# Patient Record
Sex: MALE | Race: WHITE | NOT HISPANIC OR LATINO | Employment: FULL TIME | URBAN - METROPOLITAN AREA
[De-identification: names, ages, dates, MRNs, and addresses within clinical notes are randomized per-mention and may not be internally consistent; named-entity substitution may affect disease eponyms.]

---

## 2018-11-04 ENCOUNTER — OFFICE VISIT (OUTPATIENT)
Dept: URGENT CARE | Facility: PHYSICIAN GROUP | Age: 48
End: 2018-11-04
Payer: COMMERCIAL

## 2018-11-04 ENCOUNTER — TELEPHONE (OUTPATIENT)
Dept: URGENT CARE | Facility: PHYSICIAN GROUP | Age: 48
End: 2018-11-04

## 2018-11-04 ENCOUNTER — APPOINTMENT (OUTPATIENT)
Dept: RADIOLOGY | Facility: IMAGING CENTER | Age: 48
End: 2018-11-04
Attending: NURSE PRACTITIONER
Payer: COMMERCIAL

## 2018-11-04 VITALS
HEIGHT: 73 IN | SYSTOLIC BLOOD PRESSURE: 128 MMHG | DIASTOLIC BLOOD PRESSURE: 76 MMHG | WEIGHT: 270 LBS | TEMPERATURE: 98.6 F | OXYGEN SATURATION: 94 % | RESPIRATION RATE: 18 BRPM | BODY MASS INDEX: 35.78 KG/M2 | HEART RATE: 84 BPM

## 2018-11-04 DIAGNOSIS — S86.912A KNEE STRAIN, LEFT, INITIAL ENCOUNTER: ICD-10-CM

## 2018-11-04 DIAGNOSIS — S89.92XA LEFT KNEE INJURY, INITIAL ENCOUNTER: ICD-10-CM

## 2018-11-04 PROCEDURE — 73564 X-RAY EXAM KNEE 4 OR MORE: CPT | Mod: TC,LT | Performed by: NURSE PRACTITIONER

## 2018-11-04 PROCEDURE — 99204 OFFICE O/P NEW MOD 45 MIN: CPT | Performed by: NURSE PRACTITIONER

## 2018-11-04 RX ORDER — MELOXICAM 7.5 MG/1
7.5 TABLET ORAL DAILY
Qty: 30 TAB | Refills: 0 | Status: SHIPPED | OUTPATIENT
Start: 2018-11-04

## 2018-11-04 ASSESSMENT — ENCOUNTER SYMPTOMS
MYALGIAS: 1
SENSORY CHANGE: 0
FALLS: 0
TINGLING: 0
CHILLS: 0
FEVER: 0
WEAKNESS: 0
BRUISES/BLEEDS EASILY: 0

## 2018-11-04 NOTE — PROGRESS NOTES
"Subjective:      Chevy Nuno is a 48 y.o. male who presents with Knee Pain (Lt knee, over extended, X last night )            HPI  Chevy is here for acute left knee injury last night when he tripped over plastic when he was getting out of truck. States hyperextended his left knee and now has pain in back of knee. Ice and Ibuprofen last night. C/o \"tightness in back of knee\" with flexion/extension. No previous injury to left knee. Wife present.    PMH:  has no past medical history on file.  MEDS: No current outpatient prescriptions on file.  ALLERGIES: No Known Allergies  SURGHX: History reviewed. No pertinent surgical history.  SOCHX:  reports that he has never smoked. He has never used smokeless tobacco.  FH: Family history was reviewed, no pertinent findings to report    Review of Systems   Constitutional: Negative for chills, fever and malaise/fatigue.   Cardiovascular: Negative for leg swelling.   Musculoskeletal: Positive for joint pain and myalgias. Negative for falls.   Skin: Negative for itching and rash.   Neurological: Negative for tingling, sensory change and weakness.   Endo/Heme/Allergies: Does not bruise/bleed easily.   All other systems reviewed and are negative.         Objective:     /76   Pulse 84   Temp 37 °C (98.6 °F)   Resp 18   Ht 1.854 m (6' 1\")   Wt 122.5 kg (270 lb)   SpO2 94%   BMI 35.62 kg/m²      Physical Exam   Constitutional: He is oriented to person, place, and time. Vital signs are normal. He appears well-developed and well-nourished. He is active and cooperative.  Non-toxic appearance. He does not have a sickly appearance. He does not appear ill. No distress.   HENT:   Head: Normocephalic.   Eyes: Pupils are equal, round, and reactive to light. EOM are normal.   Cardiovascular: Normal rate.    Pulmonary/Chest: Effort normal.   Musculoskeletal: He exhibits edema and tenderness. He exhibits no deformity.        Left knee: He exhibits decreased range of motion, " swelling and LCL laxity. He exhibits no effusion, no ecchymosis, no deformity, no laceration, no erythema, normal alignment, normal patellar mobility, no bony tenderness and normal meniscus. Tenderness found.        Legs:  TTP at posterior aspect of left knee joint. No redness, mild swelling to anterior knee joint, decreased ROM with flexion to posterior knee due to discomfort.    Neurological: He is alert and oriented to person, place, and time.   Skin: Skin is warm and dry. No rash noted. He is not diaphoretic. No erythema.   Vitals reviewed.    Left knee xray:  No evidence of acute fracture or dislocation.  Small joint effusion.  Mild degenerative changes.  Further evaluation can be obtained with MRI.        MA applied left knee brace  Assessment/Plan:     1. Left knee injury, initial encounter    - DX-KNEE COMPLETE 4+ LEFT; Future  - REFERRAL TO SPORTS MEDICINE  - meloxicam (MOBIC) 7.5 MG Tab; Take 1 Tab by mouth every day.  Dispense: 30 Tab; Refill: 0    2. Knee strain, left, initial encounter    - REFERRAL TO SPORTS MEDICINE  - meloxicam (MOBIC) 7.5 MG Tab; Take 1 Tab by mouth every day.  Dispense: 30 Tab; Refill: 0    May use cool compresses for swelling prn  May utilize RICE method prn  Avoid excessive weight bearing until pan/swelling improve and rechecked by Sports Med  May apply topical analgesics prn  Perform proper body mechanics with lifting, twisting, bending and walking  Monitor for deformity, numbness/tingling in toes, decreased ROM with waking difficulty- need re-evaluation  Follow up with Sports Med next week

## 2018-11-08 ENCOUNTER — OFFICE VISIT (OUTPATIENT)
Dept: MEDICAL GROUP | Facility: CLINIC | Age: 48
End: 2018-11-08
Payer: COMMERCIAL

## 2018-11-08 VITALS
TEMPERATURE: 98.6 F | BODY MASS INDEX: 35.78 KG/M2 | HEIGHT: 73 IN | HEART RATE: 72 BPM | DIASTOLIC BLOOD PRESSURE: 84 MMHG | OXYGEN SATURATION: 96 % | SYSTOLIC BLOOD PRESSURE: 124 MMHG | RESPIRATION RATE: 18 BRPM | WEIGHT: 270 LBS

## 2018-11-08 DIAGNOSIS — S82.832A CLOSED FRACTURE OF PROXIMAL END OF LEFT FIBULA, UNSPECIFIED FRACTURE MORPHOLOGY, INITIAL ENCOUNTER: ICD-10-CM

## 2018-11-08 PROCEDURE — 99202 OFFICE O/P NEW SF 15 MIN: CPT | Mod: 25 | Performed by: FAMILY MEDICINE

## 2018-11-08 PROCEDURE — 27780 TREATMENT OF FIBULA FRACTURE: CPT | Mod: LT | Performed by: FAMILY MEDICINE

## 2018-11-08 ASSESSMENT — ENCOUNTER SYMPTOMS
HEADACHES: 0
SHORTNESS OF BREATH: 0
DIZZINESS: 0
NAUSEA: 0
FEVER: 0
CHILLS: 0
VOMITING: 0

## 2018-11-08 NOTE — PROGRESS NOTES
"Subjective:      Chevy Nuno is a 48 y.o. male who presents with Knee Pain (Referral from [facility]/ L knee pain )     Referred by GABRIELE Dick for evaluation of LEFT knee and ankle pain    HPI   LEFT knee and ankle pain   Date of injury, Saturday, November 3, 2018  Mechanism of injury, hyperextension of the LEFT knee  Sudden pain at the LEFT knee  Posterior knee, left leg, and LEFT lateral ankle from fibula to posterior heel region  Blacked out from the pain  Able to walk after the injury  Currently having pain predominantly at the proximal fibular region and with radiation down to the LEFT ankle and posterior ankle  POSITIVE swelling along the LEFT ankle  Denies any prior issues with the LEFT leg  Meloxicam which is not helping  POSITIVE night symptoms, constant    Review of Systems   Constitutional: Negative for chills and fever.   Respiratory: Negative for shortness of breath.    Cardiovascular: Negative for chest pain.   Gastrointestinal: Negative for nausea and vomiting.   Neurological: Negative for dizziness and headaches.     PMH:  has no past medical history on file.  MEDS:   Current Outpatient Prescriptions:   •  meloxicam (MOBIC) 7.5 MG Tab, Take 1 Tab by mouth every day., Disp: 30 Tab, Rfl: 0  ALLERGIES: No Known Allergies  SURGHX: History reviewed. No pertinent surgical history.  SOCHX:  reports that he has never smoked. He has never used smokeless tobacco.  FH: Family history was reviewed, no pertinent findings to report     Objective:     /84 (BP Location: Left arm, Patient Position: Sitting, BP Cuff Size: Adult)   Pulse 72   Temp 37 °C (98.6 °F) (Temporal)   Resp 18   Ht 1.854 m (6' 1\")   Wt 122.5 kg (270 lb)   SpO2 96%   BMI 35.62 kg/m²      Physical Exam     RIGHT ANKLE:  There is NO swelling noted at the ankle  Range of motion intact with dorsiflexion and plantarflexion, inversion and eversion  There is NO tenderness of the ATFL, CF or PTF ligament  There is NO tenderness " of the lateral malleolus or medial malleolus  Anterior drawer testing is NEGATIVE  Talar tilt testing is NEGATIVE  The foot and ankle is otherwise neurovascularly intact    RIGHT FOOT:  There is NO swelling noted at the foot  There is NO tenderness at the base of the fifth metatarsal, cuboid, or tarsal navicular  There is NO pain with metatarsal squeeze test    LEFT ANKLE:  There is POSITIVE swelling noted at the ankle  POSITIVE tenderness at the proximal fibula on the LEFT  Range of motion intact with dorsiflexion and plantarflexion, inversion and eversion  There is NO tenderness of the ATFL, CF or PTF ligament  There is NO tenderness of the lateral malleolus or medial malleolus  Anterior drawer testing is NEGATIVE  Talar tilt testing is NEGATIVE  The foot and ankle is otherwise neurovascularly intact    LEFT FOOT:  There is NO swelling noted at the foot  There is NO tenderness at the base of the fifth metatarsal, cuboid, or tarsal navicular  There is NO pain with metatarsal squeeze test    NEUTRAL stance  Able to ambulate with ANTALGIC gait       Assessment/Plan:     1. Closed fracture of proximal end of left fibula, unspecified fracture morphology, initial encounter       Acute injury, together with sudden sharp pain the lateral leg POSITIVE tenderness at the proximal fibula and POSITIVE cortical irregularity noted on x-ray which was not mentioned on the official report is consistent with acute proximal fibula fracture    Date of injury, Saturday, November 3, 2018  Nondisplaced proximal fibula fracture of the LEFT leg  Maisonneuve type fracture  Provided cam walker boot    The plan is to continue cam walker boot for a total of 6 weeks from the date of injury removal on or after December 15, 2018                11/4/2018 10:26 AM    HISTORY/REASON FOR EXAM:  Injury      TECHNIQUE/EXAM DESCRIPTION AND NUMBER OF VIEWS:  4 views of the LEFT knee.    COMPARISON: None    FINDINGS:  No acute fracture or dislocation is  seen. There is mild spurring of the tibial spines. There is minimal spurring of the patella. There is a small joint effusion.     Impression       No evidence of acute fracture or dislocation.    Small joint effusion.    Mild degenerative changes.    Further evaluation can be obtained with MRI.     Interpreted in the office today with the patient    Thank you GABRIELE Dick for allowing me to participate in caring for your patient.

## 2018-11-26 ENCOUNTER — OFFICE VISIT (OUTPATIENT)
Dept: MEDICAL GROUP | Facility: CLINIC | Age: 48
End: 2018-11-26
Payer: COMMERCIAL

## 2018-11-26 ENCOUNTER — HOSPITAL ENCOUNTER (OUTPATIENT)
Dept: RADIOLOGY | Facility: MEDICAL CENTER | Age: 48
End: 2018-11-26
Attending: FAMILY MEDICINE
Payer: COMMERCIAL

## 2018-11-26 VITALS
HEIGHT: 73 IN | HEART RATE: 70 BPM | OXYGEN SATURATION: 97 % | BODY MASS INDEX: 35.78 KG/M2 | WEIGHT: 270 LBS | RESPIRATION RATE: 18 BRPM | DIASTOLIC BLOOD PRESSURE: 80 MMHG | SYSTOLIC BLOOD PRESSURE: 122 MMHG | TEMPERATURE: 98.6 F

## 2018-11-26 DIAGNOSIS — M79.605 LEG PAIN, POSTERIOR, LEFT: ICD-10-CM

## 2018-11-26 DIAGNOSIS — S82.832D CLOSED FRACTURE OF PROXIMAL END OF LEFT FIBULA WITH ROUTINE HEALING, UNSPECIFIED FRACTURE MORPHOLOGY, SUBSEQUENT ENCOUNTER: ICD-10-CM

## 2018-11-26 PROCEDURE — 93971 EXTREMITY STUDY: CPT | Mod: LT

## 2018-11-26 PROCEDURE — 99212 OFFICE O/P EST SF 10 MIN: CPT | Mod: 24 | Performed by: FAMILY MEDICINE

## 2018-11-26 ASSESSMENT — ENCOUNTER SYMPTOMS
HEADACHES: 0
FEVER: 0
VOMITING: 0
DIZZINESS: 0
NAUSEA: 0
CHILLS: 0
SHORTNESS OF BREATH: 0

## 2018-11-26 NOTE — PROGRESS NOTES
"Subjective:      Chevy Nuno is a 48 y.o. male who presents with Ankle Injury (F/V L ankle injury )     Referred by GABRIELE Dick for evaluation of LEFT knee and ankle pain    HPI   FOLLOW UP for LEFT proximal fibula fracture and leg pain  Date of injury, Saturday, November 3, 2018  Mechanism of injury, hyperextension of the LEFT knee    Lateral leg pain is somewhat improved, now having posterior leg pain  Posterior to the knee, distal thigh and calf region on the LEFT    Review of Systems   Constitutional: Negative for chills and fever.   Respiratory: Negative for shortness of breath.    Cardiovascular: Negative for chest pain.   Gastrointestinal: Negative for nausea and vomiting.   Neurological: Negative for dizziness and headaches.     PMH:  has no past medical history on file.  MEDS:   Current Outpatient Prescriptions:   •  meloxicam (MOBIC) 7.5 MG Tab, Take 1 Tab by mouth every day., Disp: 30 Tab, Rfl: 0  ALLERGIES: No Known Allergies  SURGHX: No past surgical history on file.  SOCHX:  reports that he has never smoked. He has never used smokeless tobacco.  FH: Family history was reviewed, no pertinent findings to report     Objective:     /80 (BP Location: Left arm, Patient Position: Sitting, BP Cuff Size: Adult)   Pulse 70   Temp 37 °C (98.6 °F) (Temporal)   Resp 18   Ht 1.854 m (6' 1\")   Wt 122.5 kg (270 lb)   SpO2 97%   BMI 35.62 kg/m²      Physical Exam      LEFT ANKLE:  There is POSITIVE swelling noted at the ankle  MINIMAL to NO tenderness at the proximal fibula on the LEFT  Range of motion intact with dorsiflexion and plantarflexion, inversion and eversion  There is NO tenderness of the ATFL, CF or PTF ligament  There is NO tenderness of the lateral malleolus or medial malleolus  Anterior drawer testing is NEGATIVE  Talar tilt testing is NEGATIVE  The foot and ankle is otherwise neurovascularly intact    LEFT FOOT:  There is NO swelling noted at the foot  There is NO " tenderness at the base of the fifth metatarsal, cuboid, or tarsal navicular  There is NO pain with metatarsal squeeze test    NEUTRAL stance  Able to ambulate with ANTALGIC gait       Assessment/Plan:     1. Closed fracture of proximal end of left fibula with routine healing, unspecified fracture morphology, subsequent encounter     2. Leg pain, posterior, left  US-EXTREMITY VENOUS LOWER UNILAT LEFT     We will obtain ultrasound study of the LEFT lower extremity to rule out DVT since he seems to have pain and pressure sensitivity    Acute injury, together with sudden sharp pain the lateral leg POSITIVE tenderness at the proximal fibula and POSITIVE cortical irregularity noted on x-ray which was not mentioned on the official report is consistent with acute proximal fibula fracture    Date of injury, Saturday, November 3, 2018  Nondisplaced proximal fibula fracture of the LEFT leg  Maisonneuve type fracture  Continue fracture stabilization and cam walker boot    The plan is to continue cam walker boot for a total of 6 weeks from the date of injury removal on or after December 15, 2018    Return in about 2 weeks (around 12/10/2018).              11/4/2018 10:26 AM    HISTORY/REASON FOR EXAM:  Injury      TECHNIQUE/EXAM DESCRIPTION AND NUMBER OF VIEWS:  4 views of the LEFT knee.    COMPARISON: None    FINDINGS:  No acute fracture or dislocation is seen. There is mild spurring of the tibial spines. There is minimal spurring of the patella. There is a small joint effusion.     Impression       No evidence of acute fracture or dislocation.    Small joint effusion.    Mild degenerative changes.    Further evaluation can be obtained with MRI.     Thank you GABRIELE Dick for allowing me to participate in caring for your patient.

## 2025-08-06 ENCOUNTER — OFFICE VISIT (OUTPATIENT)
Dept: URGENT CARE | Facility: PHYSICIAN GROUP | Age: 55
End: 2025-08-06
Payer: COMMERCIAL

## 2025-08-06 VITALS
BODY MASS INDEX: 40.89 KG/M2 | RESPIRATION RATE: 18 BRPM | TEMPERATURE: 97.9 F | SYSTOLIC BLOOD PRESSURE: 120 MMHG | WEIGHT: 301.9 LBS | OXYGEN SATURATION: 96 % | DIASTOLIC BLOOD PRESSURE: 80 MMHG | HEIGHT: 72 IN | HEART RATE: 84 BPM
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
## 2025-08-06 PROCEDURE — 3074F SYST BP LT 130 MM HG: CPT | Performed by: PHYSICIAN ASSISTANT

## 2025-08-06 PROCEDURE — 1125F AMNT PAIN NOTED PAIN PRSNT: CPT | Performed by: PHYSICIAN ASSISTANT

## 2025-08-06 PROCEDURE — 3079F DIAST BP 80-89 MM HG: CPT | Performed by: PHYSICIAN ASSISTANT

## 2025-08-06 PROCEDURE — 99203 OFFICE O/P NEW LOW 30 MIN: CPT | Performed by: PHYSICIAN ASSISTANT

## 2025-08-06 RX ORDER — SULFAMETHOXAZOLE AND TRIMETHOPRIM 800; 160 MG/1; MG/1
1 TABLET ORAL 2 TIMES DAILY
Qty: 14 TABLET | Refills: 0 | Status: SHIPPED | OUTPATIENT
Start: 2025-08-06 | End: 2025-08-13

## 2025-08-06 ASSESSMENT — PAIN SCALES - GENERAL: PAINLEVEL_OUTOF10: 2=MINIMAL-SLIGHT

## 2025-08-06 ASSESSMENT — ENCOUNTER SYMPTOMS
TINGLING: 0
MYALGIAS: 0
FEVER: 0
CHILLS: 0

## 2025-08-16 ENCOUNTER — OFFICE VISIT (OUTPATIENT)
Dept: URGENT CARE | Facility: CLINIC | Age: 55
End: 2025-08-16
Payer: COMMERCIAL

## 2025-08-16 VITALS
SYSTOLIC BLOOD PRESSURE: 124 MMHG | DIASTOLIC BLOOD PRESSURE: 72 MMHG | WEIGHT: 302.03 LBS | TEMPERATURE: 97.3 F | OXYGEN SATURATION: 95 % | BODY MASS INDEX: 40.03 KG/M2 | HEIGHT: 73 IN | HEART RATE: 86 BPM
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
## 2025-08-16 PROCEDURE — 3074F SYST BP LT 130 MM HG: CPT | Performed by: FAMILY MEDICINE

## 2025-08-16 PROCEDURE — 3078F DIAST BP <80 MM HG: CPT | Performed by: FAMILY MEDICINE

## 2025-08-16 PROCEDURE — 99214 OFFICE O/P EST MOD 30 MIN: CPT | Performed by: FAMILY MEDICINE

## 2025-08-16 RX ORDER — CEPHALEXIN 500 MG/1
500 CAPSULE ORAL 4 TIMES DAILY
Qty: 28 CAPSULE | Refills: 0 | Status: SHIPPED | OUTPATIENT
Start: 2025-08-16 | End: 2025-08-23

## 2025-08-16 RX ORDER — TRIAMCINOLONE ACETONIDE 1 MG/G
OINTMENT TOPICAL
Qty: 30 G | Refills: 0 | Status: SHIPPED | OUTPATIENT
Start: 2025-08-16

## 2025-08-16 RX ORDER — METHYLPREDNISOLONE 4 MG/1
TABLET ORAL
Qty: 21 TABLET | Refills: 0 | Status: SHIPPED | OUTPATIENT
Start: 2025-08-16

## 2025-08-18 ASSESSMENT — ENCOUNTER SYMPTOMS
EYE DISCHARGE: 0
WEIGHT LOSS: 0
EYE REDNESS: 0
NAUSEA: 0
MYALGIAS: 0
VOMITING: 0